# Patient Record
(demographics unavailable — no encounter records)

---

## 2025-01-09 NOTE — CARDIOLOGY SUMMARY
[de-identified] : 1/9/2025, NSR with low voltages and poor R wave progression 7/6/2023, NSR, normal ECG 11/17/2022, NSR, normal ECG [de-identified] : 06/20/2020, Pharmacologic Nuclear Stress Testing: normal SPECT images. [de-identified] : 2/8/2024, LV EF 60-65%, normal LV diastolic function, trace MR, mild AI, trace TR. 12/21/2022, LV EF 55-60%, normal LV diastolic function, trace-mild MR, mild AI, mild TR with estimated PASP of 28mmHg. 01/09/2020, Trace-mild MR, mild AI, normal estimated PASP. LVEF 60%.  [de-identified] : 8/21/2023, CTA of the Coronary Arteries: normal coronary arteries. [de-identified] : 2/8/2024, Carotid Duplex: mild, non-obstructive disease bilaterally 12/21/2022, Carotid Duplex: mild, non-obstructive disease bilaterally.

## 2025-01-09 NOTE — HISTORY OF PRESENT ILLNESS
[FreeTextEntry1] : Historical Perspective: This is a 66 year old female with history of prediabetes (HbA1C of 6.3%, 12,12,2019), HLD, presents in consultation for chest pain and SOB. Patient states the chest pain has been going on for a few months but has got about 3-4 episodes of it. It occurs at rest and is left sided. Non-radiating. She states it lasts for a "long time." Then goes away by itself without any particular intervention. Not getting more frequent or severe. She does notice SOB when walking with friends for exercise. She is unable to keep up and gets winded quickly. When she walks by herself she feels fine.   Patient has noticed a random pain in her upper left chest. Not with exertion. No associated SOB. Lasts a couple of minutes then resolves spontaneously. Staying stable in frequency and severity over the past couple of months.  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 She underwent surgery in December 2024 and now is cured. No chest pain, dyspnea or palpitations. Compliant with medical therapy and reports no adverse effects.

## 2025-01-09 NOTE — PHYSICAL EXAM
[Normal] : moves all extremities, no focal deficits, normal speech [de-identified] : No carotid artery bruits auscultated bilaterally

## 2025-01-09 NOTE — DISCUSSION/SUMMARY
[FreeTextEntry1] : 1. Chest Pain/Dyspnea: ECG is normal. Patient with structurally normal heart on echocardiogram from 1/9/2020. Patient underwent ETT on 3/4/2020. There was an excessive rise in BP to 200mmHg systolic. There were ECG changes as well. Patient underwent pharmacologic nuclear stress test on 7/20/2020 and the SPECT images were normal. Patient also underwent CTA of the Coronary Arteries, August 2023, and she had normal coronary arteries.   2. HLD: on Crestor 20mg daily. Tolerating with no adverse effects.  3. Hypertension: patient on HCTZ 25mg daily. Had excessive rise in BP during ETT from 3/4/2020. Patient started on Losartan 25mg daily. Tolerating well with no adverse effects. Continue.   4. Aortic Valve Insufficiency: mild, periodic echo surveillance.  6. Carotid Atherosclerosis: stable mild bilateral disease on carotid duplex from 12/21/2022. Recommend continuing rosuvastatin 20mg daily.  Follow up in 6 months. [EKG obtained to assist in diagnosis and management of assessed problem(s)] : EKG obtained to assist in diagnosis and management of assessed problem(s)